# Patient Record
Sex: FEMALE | Race: WHITE
[De-identification: names, ages, dates, MRNs, and addresses within clinical notes are randomized per-mention and may not be internally consistent; named-entity substitution may affect disease eponyms.]

---

## 2017-07-14 ENCOUNTER — HOSPITAL ENCOUNTER (OUTPATIENT)
Dept: HOSPITAL 62 - OD | Age: 27
End: 2017-07-14
Attending: NURSE PRACTITIONER
Payer: OTHER GOVERNMENT

## 2017-07-14 DIAGNOSIS — R22.2: Primary | ICD-10-CM

## 2017-07-14 PROCEDURE — 72110 X-RAY EXAM L-2 SPINE 4/>VWS: CPT

## 2017-07-14 NOTE — RADIOLOGY REPORT (SQ)
EXAM DESCRIPTION:  LUMBAR SPINE COMPLETE



COMPLETED DATE/TIME:  7/14/2017 4:25 pm



REASON FOR STUDY:  LOCALIZED SWELLING, MASS AND LUMP, TRUNK R22.2  LOCALIZED SWELLING, MASS AND LUMP,
 TRUNK



COMPARISON:  None.



NUMBER OF VIEWS:  Five views including obliques.



TECHNIQUE:  AP, lateral, oblique, and sacral radiographic images acquired of the lumbar spine.



LIMITATIONS:  None.



FINDINGS:  MINERALIZATION: Normal.

SEGMENTATION: Normal.  No transitional anatomy.

ALIGNMENT: Normal.

VERTEBRAE: Maintained height.  No fracture or worrisome bone lesion.

DISCS: Preserved height.  No significant osteophytes or end plate irregularity.

POSTERIOR ELEMENTS: Pedicles and facets are intact.  No pars defect or posterior arch defects.

HARDWARE: None in the spine.

PARASPINAL SOFT TISSUES: Normal.

PELVIS: Intact as visualized. No fractures or worrisome bone lesions. SI joints intact.

OTHER: No other significant finding.



IMPRESSION:  NORMAL 5 VIEW LUMBAR SPINE.



TECHNICAL DOCUMENTATION:  JOB ID:  [id number]

 2011 [employer]- All Rights Reserved

## 2017-07-21 ENCOUNTER — HOSPITAL ENCOUNTER (OUTPATIENT)
Dept: HOSPITAL 62 - WI | Age: 27
End: 2017-07-21
Attending: NURSE PRACTITIONER
Payer: OTHER GOVERNMENT

## 2017-07-21 DIAGNOSIS — R22.2: Primary | ICD-10-CM

## 2017-07-21 PROCEDURE — 76705 ECHO EXAM OF ABDOMEN: CPT

## 2017-07-21 NOTE — WOMENS IMAGING REPORT
EXAM DESCRIPTION:  U/S ABDOMEN LIMITED



COMPLETED DATE/TIME:  7/21/2017 11:00 am



REASON FOR STUDY:  R22.2



COMPARISON:  None.



TECHNIQUE:  Sonographic sections through the superficial soft tissues in the midline of the lower felisha
k were obtained in varying positions.



LIMITATIONS:  None.



FINDINGS:  No discrete solid or cystic mass is identified the superficial fat and soft tissue planes 
are well preserved.  No other significant abnormalities were identified.



IMPRESSION:  No significant findings.



TECHNICAL DOCUMENTATION:  JOB ID:  [id number]

 2011 [employer]- All Rights Reserved

## 2019-04-12 ENCOUNTER — HOSPITAL ENCOUNTER (EMERGENCY)
Dept: HOSPITAL 62 - ER | Age: 29
Discharge: HOME | End: 2019-04-12
Payer: COMMERCIAL

## 2019-04-12 VITALS — DIASTOLIC BLOOD PRESSURE: 73 MMHG | SYSTOLIC BLOOD PRESSURE: 115 MMHG

## 2019-04-12 DIAGNOSIS — R10.9: ICD-10-CM

## 2019-04-12 DIAGNOSIS — R11.2: Primary | ICD-10-CM

## 2019-04-12 DIAGNOSIS — R35.0: ICD-10-CM

## 2019-04-12 DIAGNOSIS — R10.30: ICD-10-CM

## 2019-04-12 DIAGNOSIS — R10.31: ICD-10-CM

## 2019-04-12 LAB
ADD MANUAL DIFF: NO
ALBUMIN SERPL-MCNC: 4 G/DL (ref 3.5–5)
ALP SERPL-CCNC: 57 U/L (ref 38–126)
ALT SERPL-CCNC: 28 U/L (ref 9–52)
ANION GAP SERPL CALC-SCNC: 11 MMOL/L (ref 5–19)
APPEARANCE UR: (no result)
APTT PPP: (no result) S
AST SERPL-CCNC: 24 U/L (ref 14–36)
BASOPHILS # BLD AUTO: 0 10^3/UL (ref 0–0.2)
BASOPHILS NFR BLD AUTO: 0.6 % (ref 0–2)
BILIRUB DIRECT SERPL-MCNC: 0.2 MG/DL (ref 0–0.4)
BILIRUB SERPL-MCNC: 0.5 MG/DL (ref 0.2–1.3)
BILIRUB UR QL STRIP: NEGATIVE
BUN SERPL-MCNC: 16 MG/DL (ref 7–20)
CALCIUM: 9.9 MG/DL (ref 8.4–10.2)
CHLORIDE SERPL-SCNC: 107 MMOL/L (ref 98–107)
CO2 SERPL-SCNC: 20 MMOL/L (ref 22–30)
EOSINOPHIL # BLD AUTO: 0 10^3/UL (ref 0–0.6)
EOSINOPHIL NFR BLD AUTO: 0.5 % (ref 0–6)
ERYTHROCYTE [DISTWIDTH] IN BLOOD BY AUTOMATED COUNT: 12.9 % (ref 11.5–14)
GLUCOSE SERPL-MCNC: 128 MG/DL (ref 75–110)
GLUCOSE UR STRIP-MCNC: NEGATIVE MG/DL
HCT VFR BLD CALC: 39.2 % (ref 36–47)
HGB BLD-MCNC: 13.7 G/DL (ref 12–15.5)
KETONES UR STRIP-MCNC: 80 MG/DL
LYMPHOCYTES # BLD AUTO: 0.9 10^3/UL (ref 0.5–4.7)
LYMPHOCYTES NFR BLD AUTO: 13.2 % (ref 13–45)
MCH RBC QN AUTO: 31.3 PG (ref 27–33.4)
MCHC RBC AUTO-ENTMCNC: 34.9 G/DL (ref 32–36)
MCV RBC AUTO: 90 FL (ref 80–97)
MONOCYTES # BLD AUTO: 1 10^3/UL (ref 0.1–1.4)
MONOCYTES NFR BLD AUTO: 13.2 % (ref 3–13)
NEUTROPHILS # BLD AUTO: 5.2 10^3/UL (ref 1.7–8.2)
NEUTS SEG NFR BLD AUTO: 72.5 % (ref 42–78)
NITRITE UR QL STRIP: NEGATIVE
PH UR STRIP: 8 [PH] (ref 5–9)
PLATELET # BLD: 166 10^3/UL (ref 150–450)
POTASSIUM SERPL-SCNC: 3.5 MMOL/L (ref 3.6–5)
PROT SERPL-MCNC: 7 G/DL (ref 6.3–8.2)
PROT UR STRIP-MCNC: 30 MG/DL
RBC # BLD AUTO: 4.37 10^6/UL (ref 3.72–5.28)
SODIUM SERPL-SCNC: 137.8 MMOL/L (ref 137–145)
SP GR UR STRIP: 1.03
TOTAL CELLS COUNTED % (AUTO): 100 %
UROBILINOGEN UR-MCNC: 2 MG/DL (ref ?–2)
WBC # BLD AUTO: 7.2 10^3/UL (ref 4–10.5)

## 2019-04-12 PROCEDURE — 81001 URINALYSIS AUTO W/SCOPE: CPT

## 2019-04-12 PROCEDURE — 96361 HYDRATE IV INFUSION ADD-ON: CPT

## 2019-04-12 PROCEDURE — 74176 CT ABD & PELVIS W/O CONTRAST: CPT

## 2019-04-12 PROCEDURE — 84703 CHORIONIC GONADOTROPIN ASSAY: CPT

## 2019-04-12 PROCEDURE — 36415 COLL VENOUS BLD VENIPUNCTURE: CPT

## 2019-04-12 PROCEDURE — 85025 COMPLETE CBC W/AUTO DIFF WBC: CPT

## 2019-04-12 PROCEDURE — 80053 COMPREHEN METABOLIC PANEL: CPT

## 2019-04-12 PROCEDURE — 96375 TX/PRO/DX INJ NEW DRUG ADDON: CPT

## 2019-04-12 PROCEDURE — 99284 EMERGENCY DEPT VISIT MOD MDM: CPT

## 2019-04-12 PROCEDURE — 96374 THER/PROPH/DIAG INJ IV PUSH: CPT

## 2019-04-12 NOTE — ER DOCUMENT REPORT
ED GI/





- General


Chief Complaint: Abdominal Pain


Stated Complaint: ABDOMINAL PAIN AND VOMITING


Time Seen by Provider: 04/12/19 02:58


Primary Care Provider: 


BYRON CARVAJAL NP-C [NURSE PRACTITIONER] - Follow up as needed


Notes: 


Patient is a 28-year-old female that comes to the emergency department for chief

complaint of severe sudden onset pain that started at 2300 tonight, she states 

that the pain caused her to vomit multiple times, pain is in the right mid to 

lower abdomen and radiates around to the right flank.  She denies history of 

kidney stones, she is on birth control, she denies any abdominal surgeries.  She

denies fever chills, vaginal bleeding or discharge.  Only medication she takes 

is contraceptive.





TRAVEL OUTSIDE OF THE [place] IN LAST 30 DAYS: No





- Related Data


Allergies/Adverse Reactions: 


                                        





egg Allergy (Verified 04/12/19 02:28)


   


iodine Allergy (Verified 04/12/19 02:28)


   











Past Medical History





- General


Information source: Patient





- Social History


Smoking Status: Never Smoker


Frequency of alcohol use: None


Drug Abuse: None


Lives with: Family


Family History: Reviewed & Not Pertinent





- Medical History


Medical History: Negative


Surgical Hx: Negative





- Immunizations


Immunizations up to date: Yes


Hx Diphtheria, Pertussis, Tetanus Vaccination: Yes





Review of Systems





- Review of Systems


Constitutional: No symptoms reported


EENT: No symptoms reported


Cardiovascular: No symptoms reported


Respiratory: No symptoms reported


Gastrointestinal: See HPI


Genitourinary: See HPI


Female Genitourinary: See HPI


Musculoskeletal: No symptoms reported


Skin: No symptoms reported


Hematologic/Lymphatic: No symptoms reported


Neurological/Psychological: No symptoms reported





Physical Exam





- Vital signs


Vitals: 


                                        











Temp Pulse Resp BP Pulse Ox


 


 98.4 F   83   26 H  117/69   99 


 


 04/12/19 02:31  04/12/19 02:31  04/12/19 02:31  04/12/19 02:31  04/12/19 02:31














- Notes


Notes: 


GENERAL: Alert, anxious, restless, appears very uncomfortable


HEAD: Normocephalic, atraumatic.


EYES: Pupils equal, round, and reactive to light. Extraocular movements intact.


ENT: Oral mucosa moist, tongue midline. Oropharynx unremarkable. Airway patent. 

Nares patent, no nasal septal hematoma, TM's intact.


NECK: Full range of motion. Supple. Trachea midline.


LUNGS: Clear to auscultation bilaterally, no wheezes, rales, or rhonchi. No 

respiratory distress.


HEART: Regular rate and rhythm. No murmur


ABDOMEN: Tenderness in the generalized right abdomen, however no specific 

guarding.  No rigidity, rebound tenderness. Abdomen is still soft.


GENITOURINARY: Deferred


EXTREMITIES: Moves all 4 extremities spontaneously. No edema, normal radial and 

dorsalis pedis pulses bilaterally. No cyanosis.


BACK: no cervical, thoracic, lumbar midline tenderness. No saddle anesthesia, 

normal distal neurovascular exam. 


NEUROLOGICAL: Alert and oriented x3. Normal speech. [cranial nerves II through 

XII grossly intact]. 


SKIN: Warm, dry, normal turgor. No rashes or lesions noted.





Course





- Re-evaluation


Re-evalutation: 


On initial presentation patient is very uncomfortable, has generalized right-

sided abdominal pain radiating around to the right flank.  She has no rigidity 

or guarding.  Vital signs unremarkable.  Concern for possible ruptured ectopic 

pregnancy, bowel perforation, kidney stone.  Patient given pain medication, 

close reevaluated, after reevaluation she is much more comfortable and well-

appearing.  Exam is actually quite benign now.





Pregnancy test is negative.  CBC unremarkable.  Chemistry unremarkable.  

Urinalysis obtained, shows ketones, no overt hematuria, no infection.  She was 

given IV fluids.  On reevaluation she is having some pain and nausea again, 

given Toradol and Benadryl, after this symptoms completely resolved.





CAT scan performed to evaluate abdomen for suspected ureterolithiasis versus 

other etiology.  This shows right hydronephrosis, hydroureter, and probable 0.9 

cm distal right ureteral stone.  Slightly confusing clinical picture because of 

lack of hematuria, phleboliths present.  Discussed with Dr. Romero.  He performed 

an ultrasound at bedside, this does indicate right-sided hydronephrosis consiste

nt with ureterolithiasis.  This does match patient's presentation.  His 

recommendation is close urology follow-up, medications at home, strict return 

precautions.  I discussed this in detail with patient, she states that she will 

definitely follow-up and she will return if she worsens.  Stable at time of 

discharge and pain-free at time of discharge.








- Vital Signs


Vital signs: 


                                        











Temp Pulse Resp BP Pulse Ox


 


 98.5 F   81   17   115/73   99 


 


 04/12/19 06:15  04/12/19 06:15  04/12/19 06:15  04/12/19 06:15  04/12/19 06:15














- Laboratory


Result Diagrams: 


                                 04/12/19 03:10





                                 04/12/19 03:10


Laboratory results interpreted by me: 


                                        











  04/12/19 04/12/19 04/12/19





  03:10 03:10 04:45


 


Monocytes %  13.2 H  


 


Potassium   3.5 L 


 


Carbon Dioxide   20 L 


 


Glucose   128 H 


 


Urine Protein    30 H


 


Urine Ketones    80 H


 


Urine Urobilinogen    2.0 H


 


Ur Leukocyte Esterase    TRACE H














Discharge





- Discharge


Clinical Impression: 


 Flank pain





Abdominal pain


Qualifiers:


 Abdominal location: lower abdomen, unspecified Qualified Code(s): R10.30 - 

Lower abdominal pain, unspecified





Vomiting


Qualifiers:


 Vomiting type: unspecified Vomiting Intractability: non-intractable Nausea 

presence: with nausea Qualified Code(s): R11.2 - Nausea with vomiting, 

unspecified





Condition: Stable


Disposition: HOME, SELF-CARE


Additional Instructions: 


Your evaluation is consistent with a 9 mm stone that is in the tube between the 

bladder and the ureter, it is near the bladder.  Call the urologist today to set

up close follow-up.  Take medications as prescribed for pain and to help pass 

the stone.  Return immediately if you develop any concerning or worsening 

symptoms including uncontrolled pain, vomiting, fever/chills, or any other 

concerning symptoms.


______________________





Formerly [facility]


[address] [place], [place] [id number] [phone]





[facility]


[address] [place], [place] [id number] [phone]





Prescriptions: 


Ketorolac Tromethamine [Toradol 10 mg Tablet] 10 mg PO Q8HP PRN #24 tablet


 PRN Reason: 


Ondansetron [Zofran Odt 4 mg Tablet] 1 - 2 tab PO Q4H PRN #15 tab.rapdis


 PRN Reason: For Nausea/Vomiting


Oxycodone HCl/Acetaminophen [Percocet 5-325 mg Tablet] 1 - 2 tab PO Q6H PRN #15 

tablet


 PRN Reason: 


Tamsulosin HCl [Flomax 0.4 mg Cap.sr] 0.4 mg PO DAILY #7 cap.sr.24h


Forms:  Return to Work


Referrals: 


ALENA,BYRON, NP-C [NURSE PRACTITIONER] - Follow up as needed

## 2019-04-12 NOTE — ER DOCUMENT REPORT
Doctor's Note


Notes: 


I personally and independently obtained patient history and examined the patient

in conjunction with the APC and agree with the assessment, treatment plan and 

disposition of the patient as recorded by the APC, and have reviewed the APC's 

note.





HISTORY OF PRESENT ILLNESS:


Patient is a 28 year old female that presents to the emergency department for 

chief complaint of right flank pain that radiates to the groin.  Admits to 

urinary frequeny denies hematuria.  She had associated vomiting and nausea and 

rated the pain as a 10/10 and severe upon initial presentation.  





ROS:


Constitutional: Negative for fever.


Cardiovascular: Negative for chest pain.


Respiratory: Negative for shortness of breath.


Gastrointestinal: Positive for nausea and vomiting


Musculoskeletal: Negative for arm, leg or back pain


Skin: Negative for rash.


Neurological: Negative for weakness or numbness.





Other than noted above, the 12 point review of systems was reviewed with the 

patient and were negative, all pertinent findings are included in the HPI.








PHYSICAL EXAMINATION:





Vital signs reviewed, nursing noted reviewed. 





GENERAL: Well-appearing, well-nourished and in no acute distress at time of my 

examination.





HEAD: Atraumatic, normocephalic.





EYES: Eyes appear normal, conjunctiva are normal.





ENT: nares patent, oropharynx clear without exudates.  Moist mucous membranes.





NECK: Normal range of motion, supple without lymphadenopathy





LUNGS: Breath sounds clear to auscultation bilaterally and equal.  No wheezes 

rales or rhonchi.





HEART: Regular rate and rhythm without murmurs





ABDOMEN: Soft, mild right CVA tenderness, normoactive bowel sounds.  No rebound,

guarding, or rigidity. No masses appreciated.





EXTREMITIES: Nontender, good range of motion, no pitting or edema.  





NEUROLOGICAL: No focal neurological deficits. Moves all extremities 

spontaneously Motor and sensory grossly intact on exam.





PSYCH: Normal mood, normal affect.





SKIN: Warm, Dry, normal turgor, no rashes or lesions noted on exposed skin





MEDICAL DECISION MAKING:





Patient seen and examined, vital signs reviewed.  Reviewed diagnostic workup 

including reviewing CT imaging, which did demonstrate what could be a large 

ureteral stone, although patient noted to have several phleboliths as well.  UA 

negative for hematuria.  As a result bedside US performed. 





PROCEDURE:


BEDSIDE LIMITED RENAL US:


Findings:  Both kidneys were examined using curvilinear probe, there was noted 

to be mild hydronephrosis on the right, and none on the left.  Kidneys were 

otherwise normal in size, no lesions such as cysts or mass noted. 


Impression: Mild right hydronephrosis. 





Patient will be treated for ureterolithiasis, referal to urology. 





Please review detail APC documentation. 





*Note is created using voice recognition software and may contain spelling, 

syntax or grammatical errors.

## 2019-04-12 NOTE — RADIOLOGY REPORT (SQ)
EXAM DESCRIPTION:

CT ABDOMEN PELVIS WITHOUT IV CONTRAST



COMPLETED DATE/TME:  04/12/2019 04:25



CLINICAL HISTORY:

28 years Female, right abd and flank pain, vomiting, HCG NEG.



Comparison: None.



Technique:  No IV contrast. Minimal oral contrast only.  Coronal

and sagittal reformat.  This exam was performed according to our

departmental dose-optimization program, which includes automated

exposure control, adjustment of the mA and/or kV according to

patient size and/or use of iterative reconstruction

technique.[facility]: Dose Right [facility]: CareDose   [facility]: Dose Right   

[facility]: Teradose 4D    [facility]: [employer]



LIMITATIONS:

None



Findings: 

Moderate right hydronephrosis-hydroureter. There are numerous

calcifications in the pelvis including a possible 0.9 x 0.4 x 0.4

cm right distal ureteral stone, image 41 of series 601. Consider

multiphase contrast CT surveillance of the renal system as

clinically warranted.



No ascites.  No pneumoperitoneum.  Normal appendix.  No gross

evidence of gallbladder inflammation or hepatobiliary

obstruction.  No bowel obstruction.  No evidence of abdominal

aortic aneurysm.  Stool retention.  Unenhanced lower thorax,

abdominopelvic structures, and musculoskeleton appear otherwise

grossly unremarkable.



Impression: Moderate right hydronephrosis-hydroureter. There are

numerous calcifications in the pelvis including a possible 0.9 x

0.4 x 0.4 cm right distal ureteral stone, image 41 of series 601.

 Consider multiphase contrast CT surveillance of the renal system

as clinically warranted.